# Patient Record
Sex: MALE | Race: WHITE | NOT HISPANIC OR LATINO | URBAN - METROPOLITAN AREA
[De-identification: names, ages, dates, MRNs, and addresses within clinical notes are randomized per-mention and may not be internally consistent; named-entity substitution may affect disease eponyms.]

---

## 2020-04-04 ENCOUNTER — OFFICE VISIT - RIVER FALLS (OUTPATIENT)
Dept: FAMILY MEDICINE | Facility: CLINIC | Age: 45
End: 2020-04-04

## 2022-02-12 VITALS — HEART RATE: 86 BPM | TEMPERATURE: 97.1 F | DIASTOLIC BLOOD PRESSURE: 73 MMHG | SYSTOLIC BLOOD PRESSURE: 128 MMHG

## 2022-02-15 NOTE — PROGRESS NOTES
Chief Complaint    Left eye redness, no pain or itching.  Noticed yesterday.  History of Present Illness      Pt is a 44 year old male here for concerns of a red area in his eye.       His son noticed it yesterday morning.       denies discharge or drainage.  no irritation or pain.  no vision changes.  no foreign body sensation.      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      no fever or cough.  Review of Systems      Negative except as listed in HPI.  Physical Exam   Vitals & Measurements    T: 97.1   F (Tympanic)  HR: 86(Peripheral)  BP: 128/73       Vitals noted and normal.      Patient is alert, oriented and in no acute distress.      Eyes: EOMI, PERRL, conjunctiva not injected.  Left eye has redness between 6 and 9 oclock in the scleral area.         Assessment/Plan       Subconjunctival hemorrhage of left eye (H11.32)        explained diagnosis and reassured patient that it is not coorelated with underlying illness.  it will reabsorb slowly.        Follow up if not improving as anticipated.   Patient Information     Name:LONG SANCHEZ      Address:      [address] [place] [id number]     Sex:Male     YOB: 1975     [phone]     Emergency Contact:PETE SANCHEZ     MRN:[id number]     FIN:[id number]     Location:[facility]     Date of Service:04/04/2020      Primary Care Physician:       Mariah Baig, [phone]      Attending Physician:       Juliet Santana MD, [phone]  Problem List/Past Medical History    Ongoing     No qualifying data    Historical     No qualifying data  Medications   No active medications  Allergies    No Known Medication Allergies  Social History    Smoking Status - 04/04/2020     Former smoker

## 2022-02-15 NOTE — NURSING NOTE
Comprehensive Intake Entered On:  4/4/2020 1:11 PM CDT    Performed On:  4/4/2020 1:10 PM CDT by Mattie Barroso               Summary   Chief Complaint :   Left eye redness, no pain or itching.  Noticed yesterday.    Systolic Blood Pressure :   128 mmHg   Diastolic Blood Pressure :   73 mmHg   Mean Arterial Pressure :   91 mmHg   Peripheral Pulse Rate :   86 bpm   BP Method :   Electronic   HR Method :   Electronic   Temperature Tympanic :   97.1 DegF(Converted to: 36.2 DegC)  (LOW)    Mattie Barroso - 4/4/2020 1:10 PM CDT   Health Status   Allergies Verified? :   Yes   Medication History Verified? :   Yes   Tobacco Use? :   Former smoker   Mattie Barroso - 4/4/2020 1:10 PM CDT   Meds / Allergies   (As Of: 4/4/2020 1:11:35 PM CDT)   Allergies (Active)   No Known Medication Allergies  Estimated Onset Date:   Unspecified ; Created By:   TAY Boyd CMA, Jessica; Reaction Status:   Active ; Category:   Drug ; Substance:   No Known Medication Allergies ; Type:   Allergy ; Updated By:   TAY Boyd CMA, Jessica; Reviewed Date:   12/16/2014 5:59 PM CST        Medication List   (As Of: 4/4/2020 1:11:35 PM CDT)   No Known Home Medications     Mattie Barroso - 4/4/2020 1:10:36 PM